# Patient Record
Sex: FEMALE | HISPANIC OR LATINO | URBAN - METROPOLITAN AREA
[De-identification: names, ages, dates, MRNs, and addresses within clinical notes are randomized per-mention and may not be internally consistent; named-entity substitution may affect disease eponyms.]

---

## 2018-02-20 ENCOUNTER — EMERGENCY (EMERGENCY)
Facility: HOSPITAL | Age: 33
LOS: 0 days | Discharge: HOME | End: 2018-02-20
Attending: EMERGENCY MEDICINE | Admitting: EMERGENCY MEDICINE
Payer: COMMERCIAL

## 2018-02-20 VITALS — WEIGHT: 160.06 LBS | HEIGHT: 62 IN

## 2018-02-20 VITALS
DIASTOLIC BLOOD PRESSURE: 76 MMHG | OXYGEN SATURATION: 100 % | TEMPERATURE: 100 F | SYSTOLIC BLOOD PRESSURE: 125 MMHG | HEART RATE: 105 BPM | RESPIRATION RATE: 16 BRPM

## 2018-02-20 DIAGNOSIS — M79.1 MYALGIA: ICD-10-CM

## 2018-02-20 DIAGNOSIS — R11.0 NAUSEA: ICD-10-CM

## 2018-02-20 DIAGNOSIS — R50.9 FEVER, UNSPECIFIED: ICD-10-CM

## 2018-02-20 DIAGNOSIS — J11.1 INFLUENZA DUE TO UNIDENTIFIED INFLUENZA VIRUS WITH OTHER RESPIRATORY MANIFESTATIONS: ICD-10-CM

## 2018-02-20 PROCEDURE — 99284 EMERGENCY DEPT VISIT MOD MDM: CPT

## 2018-02-20 PROCEDURE — 71046 X-RAY EXAM CHEST 2 VIEWS: CPT | Mod: 26

## 2018-02-20 RX ORDER — IBUPROFEN 200 MG
600 TABLET ORAL ONCE
Qty: 0 | Refills: 0 | Status: COMPLETED | OUTPATIENT
Start: 2018-02-20 | End: 2018-02-20

## 2018-02-20 RX ADMIN — Medication 600 MILLIGRAM(S): at 18:18

## 2018-02-20 NOTE — ED ADULT NURSE NOTE - OBJECTIVE STATEMENT
Pt alert and oriented x3. Pt presents with flu like symptoms. Pt has had fever, cough, and generalized body aches xfew days. Denies SOB or chest pain

## 2018-02-20 NOTE — ED STATDOCS - PROGRESS NOTE DETAILS
Patient seen and evaluated. NAD on CXR.  REviewed symptom care for probable flu and return precautions -Medhat Naqvi PA-C

## 2018-02-20 NOTE — ED STATDOCS - OBJECTIVE STATEMENT
33y F PMH PCOS, mild HTN/HLD, c/o body aches, tactile fevers, nausea, and generalized fatigue.  No sore throat, minimal cough.  +Nasal congestion, sneezing.  Also reports b/l ear pain.  Pt also reports difficulty getting deep breaths.  No travel.  Denies dysuria, frequency.  LMP now.  PMD Dr. Ni.

## 2018-02-20 NOTE — ED STATDOCS - MEDICAL DECISION MAKING DETAILS
Likely viral URI.  Given c/o trouble w/ deep breaths will check CXR for occult PNA, if negative, DC supportive care.

## 2018-02-20 NOTE — ED STATDOCS - ATTENDING CONTRIBUTION TO CARE
I, Wyatt Dean DO,  performed the initial face to face bedside interview with this patient regarding history of present illness, review of symptoms and relevant past medical, social and family history.  I completed an independent physical examination.  I was the initial provider who evaluated this patient. I have signed out the follow up of any pending tests (i.e. labs, radiological studies) to the ACP.  I have communicated the patient’s plan of care and disposition with the ACP.

## 2018-02-20 NOTE — ED ADULT TRIAGE NOTE - CHIEF COMPLAINT QUOTE
earache started yesterday, body aches today and chills, + nausea.  patient took advil and tylenol without relief of myalgia.

## 2018-02-20 NOTE — ED STATDOCS - ENMT, MLM
Nasal mucosa clear.  Mouth with normal mucosa  Throat has no vesicles, no oropharyngeal exudates and uvula is midline.  +Post nasal drip.  TM w/ effusion b/l however no erythema or sign of infection.
